# Patient Record
Sex: MALE | Race: WHITE | NOT HISPANIC OR LATINO | Employment: UNEMPLOYED | URBAN - METROPOLITAN AREA
[De-identification: names, ages, dates, MRNs, and addresses within clinical notes are randomized per-mention and may not be internally consistent; named-entity substitution may affect disease eponyms.]

---

## 2018-02-03 ENCOUNTER — HOSPITAL ENCOUNTER (EMERGENCY)
Facility: CLINIC | Age: 38
Discharge: HOME | End: 2018-02-03
Attending: FAMILY MEDICINE | Admitting: FAMILY MEDICINE
Payer: COMMERCIAL

## 2018-02-03 VITALS
OXYGEN SATURATION: 97 % | BODY MASS INDEX: 23.8 KG/M2 | DIASTOLIC BLOOD PRESSURE: 93 MMHG | HEIGHT: 71 IN | RESPIRATION RATE: 20 BRPM | TEMPERATURE: 97 F | WEIGHT: 170 LBS | SYSTOLIC BLOOD PRESSURE: 129 MMHG | HEART RATE: 139 BPM

## 2018-02-03 DIAGNOSIS — R11.2 NAUSEA AND VOMITING, INTRACTABILITY OF VOMITING NOT SPECIFIED, UNSPECIFIED VOMITING TYPE: ICD-10-CM

## 2018-02-03 LAB
ALBUMIN SERPL-MCNC: 4.4 G/DL (ref 3.4–5)
ALP SERPL-CCNC: 64 U/L (ref 40–150)
ALT SERPL W P-5'-P-CCNC: 111 U/L (ref 0–70)
ANION GAP SERPL CALCULATED.3IONS-SCNC: 14 MMOL/L (ref 3–14)
AST SERPL W P-5'-P-CCNC: 63 U/L (ref 0–45)
BASOPHILS # BLD AUTO: 0 10E9/L (ref 0–0.2)
BASOPHILS NFR BLD AUTO: 0.2 %
BILIRUB SERPL-MCNC: 1.5 MG/DL (ref 0.2–1.3)
BUN SERPL-MCNC: 11 MG/DL (ref 7–30)
CALCIUM SERPL-MCNC: 9.6 MG/DL (ref 8.5–10.1)
CHLORIDE SERPL-SCNC: 105 MMOL/L (ref 94–109)
CO2 SERPL-SCNC: 22 MMOL/L (ref 20–32)
CREAT SERPL-MCNC: 0.69 MG/DL (ref 0.66–1.25)
DIFFERENTIAL METHOD BLD: ABNORMAL
EOSINOPHIL # BLD AUTO: 0 10E9/L (ref 0–0.7)
EOSINOPHIL NFR BLD AUTO: 0.2 %
ERYTHROCYTE [DISTWIDTH] IN BLOOD BY AUTOMATED COUNT: 12.7 % (ref 10–15)
ETHANOL SERPL-MCNC: <0.01 G/DL
GFR SERPL CREATININE-BSD FRML MDRD: >90 ML/MIN/1.7M2
GLUCOSE SERPL-MCNC: 105 MG/DL (ref 70–99)
HCT VFR BLD AUTO: 49.9 % (ref 40–53)
HGB BLD-MCNC: 17.3 G/DL (ref 13.3–17.7)
IMM GRANULOCYTES # BLD: 0 10E9/L (ref 0–0.4)
IMM GRANULOCYTES NFR BLD: 0.2 %
LIPASE SERPL-CCNC: 78 U/L (ref 73–393)
LYMPHOCYTES # BLD AUTO: 0.5 10E9/L (ref 0.8–5.3)
LYMPHOCYTES NFR BLD AUTO: 6.4 %
MCH RBC QN AUTO: 32.2 PG (ref 26.5–33)
MCHC RBC AUTO-ENTMCNC: 34.7 G/DL (ref 31.5–36.5)
MCV RBC AUTO: 93 FL (ref 78–100)
MONOCYTES # BLD AUTO: 0.9 10E9/L (ref 0–1.3)
MONOCYTES NFR BLD AUTO: 11.2 %
NEUTROPHILS # BLD AUTO: 6.6 10E9/L (ref 1.6–8.3)
NEUTROPHILS NFR BLD AUTO: 81.8 %
PLATELET # BLD AUTO: 203 10E9/L (ref 150–450)
POTASSIUM SERPL-SCNC: 4.3 MMOL/L (ref 3.4–5.3)
PROT SERPL-MCNC: 8.2 G/DL (ref 6.8–8.8)
RBC # BLD AUTO: 5.38 10E12/L (ref 4.4–5.9)
SODIUM SERPL-SCNC: 141 MMOL/L (ref 133–144)
WBC # BLD AUTO: 8 10E9/L (ref 4–11)

## 2018-02-03 PROCEDURE — 99284 EMERGENCY DEPT VISIT MOD MDM: CPT | Mod: 25 | Performed by: FAMILY MEDICINE

## 2018-02-03 PROCEDURE — 83690 ASSAY OF LIPASE: CPT | Performed by: FAMILY MEDICINE

## 2018-02-03 PROCEDURE — 25000128 H RX IP 250 OP 636: Performed by: FAMILY MEDICINE

## 2018-02-03 PROCEDURE — 99284 EMERGENCY DEPT VISIT MOD MDM: CPT | Mod: Z6 | Performed by: FAMILY MEDICINE

## 2018-02-03 PROCEDURE — 80053 COMPREHEN METABOLIC PANEL: CPT | Performed by: FAMILY MEDICINE

## 2018-02-03 PROCEDURE — 96374 THER/PROPH/DIAG INJ IV PUSH: CPT | Performed by: FAMILY MEDICINE

## 2018-02-03 PROCEDURE — 96375 TX/PRO/DX INJ NEW DRUG ADDON: CPT | Performed by: FAMILY MEDICINE

## 2018-02-03 PROCEDURE — 96361 HYDRATE IV INFUSION ADD-ON: CPT | Performed by: FAMILY MEDICINE

## 2018-02-03 PROCEDURE — 80320 DRUG SCREEN QUANTALCOHOLS: CPT | Performed by: FAMILY MEDICINE

## 2018-02-03 PROCEDURE — 85025 COMPLETE CBC W/AUTO DIFF WBC: CPT | Performed by: FAMILY MEDICINE

## 2018-02-03 PROCEDURE — 25000125 ZZHC RX 250: Performed by: FAMILY MEDICINE

## 2018-02-03 RX ORDER — MONTELUKAST SODIUM 10 MG/1
10 TABLET ORAL DAILY
COMMUNITY
Start: 2017-08-24 | End: 2022-01-11

## 2018-02-03 RX ORDER — SODIUM CHLORIDE 9 MG/ML
1000 INJECTION, SOLUTION INTRAVENOUS CONTINUOUS
Status: DISCONTINUED | OUTPATIENT
Start: 2018-02-03 | End: 2018-02-03 | Stop reason: HOSPADM

## 2018-02-03 RX ORDER — ONDANSETRON 2 MG/ML
4 INJECTION INTRAMUSCULAR; INTRAVENOUS
Status: COMPLETED | OUTPATIENT
Start: 2018-02-03 | End: 2018-02-03

## 2018-02-03 RX ORDER — ONDANSETRON 4 MG/1
4 TABLET, ORALLY DISINTEGRATING ORAL EVERY 8 HOURS PRN
Qty: 20 TABLET | Refills: 0 | Status: SHIPPED | OUTPATIENT
Start: 2018-02-03 | End: 2018-02-06

## 2018-02-03 RX ORDER — KETOROLAC TROMETHAMINE 30 MG/ML
30 INJECTION, SOLUTION INTRAMUSCULAR; INTRAVENOUS ONCE
Status: COMPLETED | OUTPATIENT
Start: 2018-02-03 | End: 2018-02-03

## 2018-02-03 RX ORDER — BUSPIRONE HYDROCHLORIDE 15 MG/1
15 TABLET ORAL 3 TIMES DAILY
COMMUNITY
Start: 2017-08-24

## 2018-02-03 RX ORDER — ONDANSETRON 4 MG/1
4 TABLET, ORALLY DISINTEGRATING ORAL ONCE
Status: COMPLETED | OUTPATIENT
Start: 2018-02-03 | End: 2018-02-03

## 2018-02-03 RX ADMIN — ONDANSETRON 4 MG: 4 TABLET, ORALLY DISINTEGRATING ORAL at 16:38

## 2018-02-03 RX ADMIN — SODIUM CHLORIDE 1000 ML: 9 INJECTION, SOLUTION INTRAVENOUS at 18:45

## 2018-02-03 RX ADMIN — SODIUM CHLORIDE 1000 ML: 9 INJECTION, SOLUTION INTRAVENOUS at 17:46

## 2018-02-03 RX ADMIN — ONDANSETRON 4 MG: 2 INJECTION INTRAMUSCULAR; INTRAVENOUS at 17:49

## 2018-02-03 RX ADMIN — KETOROLAC TROMETHAMINE 30 MG: 30 INJECTION, SOLUTION INTRAMUSCULAR at 17:51

## 2018-02-03 NOTE — ED NOTES
"Pt started vomiting this AM, dry heaves, emesis, diarrhea.  Has tried sips of water \"but it comes back up\"  "

## 2018-02-03 NOTE — ED AVS SNAPSHOT
[facility] Emergency Department    [address] [address]    Phone:  [phone]    Fax:  [phone]                                       Antonio Prieto   MRN: [id number]    Department:  [facility] Emergency Department   Date of Visit:  2/3/2018           Patient Information     Date Of Birth          1980        Your diagnoses for this visit were:     Nausea and vomiting, intractability of vomiting not specified, unspecified vomiting type        You were seen by Brennan Hurt MD.      Follow-up Information     Schedule an appointment as soon as possible for a visit with [facility].    Why:  As needed, If symptoms worsen    Contact information:    [address] [facility] [facility] [address]  [phone]          Discharge Instructions       Clear fluids and bland diet until symptoms are clearly improving.  Zofran as needed for nausea/vomiting.  Return to the emergency department if worse or changes.      24 Hour Appointment Hotline       To make an appointment at any [facility], call [phone] ([phone]). If you don't have a family doctor or clinic, we will help you find one. [facility] are conveniently located to serve the needs of you and your family.             Review of your medicines      START taking        Dose / Directions Last dose taken    * ondansetron 4 MG ODT tab   Commonly known as:  ZOFRAN ODT   Dose:  4 mg   Quantity:  20 tablet        Take 1 tablet (4 mg) by mouth every 8 hours as needed for nausea   Refills:  0        * ondansetron 4 MG ODT tab   Commonly known as:  ZOFRAN ODT   Dose:  4 mg   Quantity:  20 tablet        Take 1 tablet (4 mg) by mouth every 8 hours as needed for nausea   Refills:  0        * Notice:  This list has 2 medication(s) that are the same as other medications prescribed for you. Read the directions carefully, and ask your doctor or other care provider to review them with you.      Our records  show that you are taking the medicines listed below. If these are incorrect, please call your family doctor or clinic.        Dose / Directions Last dose taken    albuterol 108 (90 BASE) MCG/ACT Inhaler   Commonly known as:  PROAIR HFA/PROVENTIL HFA/VENTOLIN HFA   Dose:  2 puff   Indication:  Asthma        Inhale 2 puffs into the lungs every 6 hours as needed   Refills:  0        BENADRYL PO   Dose:  25-50 mg        Take 25-50 mg by mouth 2 times daily as needed   Refills:  0        busPIRone 15 MG tablet   Commonly known as:  BUSPAR   Dose:  15 mg        Take 15 mg by mouth 3 times daily   Refills:  0        FLEXERIL PO   Dose:  10 mg   Indication:  Muscle Spasm        Take 10 mg by mouth 3 times daily as needed   Refills:  0        montelukast 10 MG tablet   Commonly known as:  SINGULAIR   Dose:  10 mg        Take 10 mg by mouth daily In summer only   Refills:  0        PIROXICAM PO   Dose:  20 mg   Indication:  Mild to Moderate Pain        Take 20 mg by mouth daily as needed   Refills:  0                Prescriptions were sent or printed at these locations (2 Prescriptions)                   [facility] [address] [id number]    Telephone:  [phone]   Fax:  [phone]   Hours:                  Printed at Department/Unit printer (2 of 2)         ondansetron (ZOFRAN ODT) 4 MG ODT tab               ondansetron (ZOFRAN ODT) 4 MG ODT tab                Procedures and tests performed during your visit     Alcohol ethyl    CBC with platelets differential    Comprehensive metabolic panel    Lipase      Orders Needing Specimen Collection     None      Pending Results     No orders found from 2/1/2018 to 2/4/2018.            Pending Culture Results     No orders found from 2/1/2018 to 2/4/2018.            Pending Results Instructions     If you had any lab results that were not finalized at the time of your Discharge, you can call  the ED Lab Result RN at [phone]. You will be contacted by this team for any positive Lab results or changes in treatment. The nurses are available 7 days a week from 10A to 6:30P.  You can leave a message 24 hours per day and they will return your call.        Test Results From Your Hospital Stay        2/3/2018  6:12 PM      Component Results     Component Value Ref Range & Units Status    WBC 8.0 4.0 - 11.0 10e9/L Final    RBC Count 5.38 4.4 - 5.9 10e12/L Final    Hemoglobin 17.3 13.3 - 17.7 g/dL Final    Hematocrit 49.9 40.0 - 53.0 % Final    MCV 93 78 - 100 fl Final    MCH 32.2 26.5 - 33.0 pg Final    MCHC 34.7 31.5 - 36.5 g/dL Final    RDW 12.7 10.0 - 15.0 % Final    Platelet Count 203 150 - 450 10e9/L Final    Diff Method Automated Method  Final    % Neutrophils 81.8 % Final    % Lymphocytes 6.4 % Final    % Monocytes 11.2 % Final    % Eosinophils 0.2 % Final    % Basophils 0.2 % Final    % Immature Granulocytes 0.2 % Final    Absolute Neutrophil 6.6 1.6 - 8.3 10e9/L Final    Absolute Lymphocytes 0.5 (L) 0.8 - 5.3 10e9/L Final    Absolute Monocytes 0.9 0.0 - 1.3 10e9/L Final    Absolute Eosinophils 0.0 0.0 - 0.7 10e9/L Final    Absolute Basophils 0.0 0.0 - 0.2 10e9/L Final    Abs Immature Granulocytes 0.0 0 - 0.4 10e9/L Final         2/3/2018  6:34 PM      Component Results     Component Value Ref Range & Units Status    Sodium 141 133 - 144 mmol/L Final    Potassium 4.3 3.4 - 5.3 mmol/L Final    Chloride 105 94 - 109 mmol/L Final    Carbon Dioxide 22 20 - 32 mmol/L Final    Anion Gap 14 3 - 14 mmol/L Final    Glucose 105 (H) 70 - 99 mg/dL Final    Urea Nitrogen 11 7 - 30 mg/dL Final    Creatinine 0.69 0.66 - 1.25 mg/dL Final    GFR Estimate >90 >60 mL/min/1.7m2 Final    Non  GFR Calc    GFR Estimate If Black >90 >60 mL/min/1.7m2 Final    African American GFR Calc    Calcium 9.6 8.5 - 10.1 mg/dL Final    Bilirubin Total 1.5 (H) 0.2 - 1.3 mg/dL Final    Albumin 4.4 3.4 - 5.0 g/dL Final     "Protein Total 8.2 6.8 - 8.8 g/dL Final    Alkaline Phosphatase 64 40 - 150 U/L Final     (H) 0 - 70 U/L Final    AST 63 (H) 0 - 45 U/L Final         2/3/2018  6:32 PM      Component Results     Component Value Ref Range & Units Status    Lipase 78 73 - 393 U/L Final         2/3/2018  6:32 PM      Component Results     Component Value Ref Range & Units Status    Ethanol g/dL <0.01 <0.01 g/dL Final                Thank you for choosing [facility]       Thank you for choosing [facility] for your care. Our goal is always to provide you with excellent care. Hearing back from our patients is one way we can continue to improve our services. Please take a few minutes to complete the written survey that you may receive in the mail after you visit with us. Thank you!        [employer] Information     [employer] lets you send messages to your doctor, view your test results, renew your prescriptions, schedule appointments and more. To sign up, go to [email or web address] . Click on \"Log in\" on the left side of the screen, which will take you to the Welcome page. Then click on \"Sign up Now\" on the right side of the page.     You will be asked to enter the access code listed below, as well as some personal information. Please follow the directions to create your username and password.     Your access code is: [email or web address]  Expires: 2018  8:01 PM     Your access code will  in 90 days. If you need help or a new code, please call your [facility] clinic or [phone].        Care EveryWhere ID     This is your Care EveryWhere ID. This could be used by other organizations to access your [facility] medical records  [id number]        Equal Access to Services     DENZEL GUTIERREZ : Hadii ge Miranda, wanjda lurajivadaha, qaybta kaalmada mehreen, min miner. So [facility] [phone].    ATENCIÓN: Si habla español, tiene a amaya disposición servicios gratuitos de asistencia lingüística. Llame al " [phone].    We comply with applicable federal civil rights laws and [place] laws. We do not discriminate on the basis of race, color, national origin, age, disability, sex, sexual orientation, or gender identity.            After Visit Summary       This is your record. Keep this with you and show to your community pharmacist(s) and doctor(s) at your next visit.

## 2018-02-03 NOTE — ED PROVIDER NOTES
"  History     Chief Complaint   Patient presents with     Vomiting     started today at 0900     HPI  Antonio Prieot is a 37 year old male, past medical history significant for drug abuse, dehydration, abdominal pain, presents to the emergency department with concerns of nausea vomiting and diarrhea beginning at approximately 9:00 this morning.  History is obtained from the patient who identifies sudden onset of nausea and vomiting too many episodes to count at around 9:00 this morning.  He felt fine yesterday evening going to bed.  No suspicion of bad food or water, no recent antibiotics, no exotic travel, no contact with sick persons that he is aware of with intestinal illness.  He notes no fever does complain of chills and sweats over.  He has had approximately 3 episodes of frankly watery diarrhea type stools since symptom onset.  Cannot recall the last time he urinated.  He has been completely unable to keep fluids or solids down since onset of symptoms.    Problem List:    Patient Active Problem List    Diagnosis Date Noted     Abdominal pain, unspecified abdominal location 11/02/2013     Priority: Medium     Problem list name updated by automated process. Provider to review       Nonspecific abnormal results of liver function study 11/02/2013     Priority: Medium     Nausea 11/02/2013     Priority: Medium     Drug abuse 11/02/2013     Priority: Medium     Dehydration 11/02/2013     Priority: Medium        Past Medical History:    Past Medical History:   Diagnosis Date     Asthma      Back pain        Past Surgical History:    No past surgical history on file.    Family History:    No family history on file.    Social History:  Marital Status:  Single [1]  Social History   Substance Use Topics     Smoking status: Light Tobacco Smoker     Packs/day: 0.25     Smokeless tobacco: Never Used      Comment: Pt states \"cocaine is a once a year thing\", marijuana occassionally     Alcohol use 0.0 oz/week     2 - 7 Cans " "of beer per week      Comment: \"2-3 beers per day, it varies.  Some nights it is 2, some it is 7\"        Medications:      busPIRone (BUSPAR) 15 MG tablet   ondansetron (ZOFRAN ODT) 4 MG ODT tab   ondansetron (ZOFRAN ODT) 4 MG ODT tab   DiphenhydrAMINE HCl (BENADRYL PO)   Cyclobenzaprine HCl (FLEXERIL PO)   albuterol (PROAIR HFA, PROVENTIL HFA, VENTOLIN HFA) 108 (90 BASE) MCG/ACT inhaler   PIROXICAM PO   montelukast (SINGULAIR) 10 MG tablet         Review of Systems   All other systems reviewed and are negative.      Physical Exam   BP: (!) 129/93  Pulse: 139  Heart Rate: 118  Temp: 97  F (36.1  C)  Resp: 16  Height: 180.3 cm (5' 11\")  Weight: 77.1 kg (170 lb)  SpO2: 98 %      Physical Exam   Constitutional: He appears well-developed and well-nourished.   Appears uncomfortable, ill but nontoxic   HENT:   Head: Normocephalic and atraumatic.   Right Ear: External ear normal.   Left Ear: External ear normal.   Nose: Nose normal.   Mouth/Throat: Oropharynx is clear and moist.   Eyes: Conjunctivae and EOM are normal. Pupils are equal, round, and reactive to light.   Neck: Normal range of motion. Neck supple.   Cardiovascular: Normal rate, regular rhythm, normal heart sounds and intact distal pulses.    Pulmonary/Chest: Effort normal and breath sounds normal.   Abdominal: Soft. He exhibits no distension and no mass. There is tenderness. There is no rebound and no guarding.       Nursing note and vitals reviewed.      ED Course     ED Course     Procedures               Critical Care time:  none               Labs Ordered and Resulted from Time of ED Arrival Up to the Time of Departure from the ED   CBC WITH PLATELETS DIFFERENTIAL - Abnormal; Notable for the following:        Result Value    Absolute Lymphocytes 0.5 (*)     All other components within normal limits   COMPREHENSIVE METABOLIC PANEL - Abnormal; Notable for the following:     Glucose 105 (*)     Bilirubin Total 1.5 (*)      (*)     AST 63 (*)     All " other components within normal limits   LIPASE   ALCOHOL ETHYL       Assessments & Plan (with Medical Decision Making)   37-year-old male past medical history reviewed as above who presents with concerns of nausea and vomiting as discussed in the HPI.  Physical exam finds him alert uncomfortable with a diffusely tender although nonsurgical abdomen no rebound or guarding and normal bowel sounds.  Lab diagnostics revealed mildly elevated ALT and AST negative lipase and negative alcohol.  Normal CBC.  The patient's symptoms resolved completely with use of IV fluids and Zofran.  Plan to disposition him to home with instructions for clear fluids, bland diet until symptom resolution and the use of Zofran for nausea or vomiting.  All questions were answered the patient's disposition home to the care of his significant other in improved condition.      Disclaimer: This note consists of symbols derived from keyboarding, dictation and/or voice recognition software. As a result, there may be errors in the script that have gone undetected. Please consider this when interpreting information found in this chart.      I have reviewed the nursing notes.    I have reviewed the findings, diagnosis, plan and need for follow up with the patient.          New Prescriptions    ONDANSETRON (ZOFRAN ODT) 4 MG ODT TAB    Take 1 tablet (4 mg) by mouth every 8 hours as needed for nausea    ONDANSETRON (ZOFRAN ODT) 4 MG ODT TAB    Take 1 tablet (4 mg) by mouth every 8 hours as needed for nausea       Final diagnoses:   Nausea and vomiting, intractability of vomiting not specified, unspecified vomiting type       2/3/2018   [facility] EMERGENCY DEPARTMENT     Brennan Hurt MD  02/03/18 2002

## 2018-02-03 NOTE — ED AVS SNAPSHOT
[facility] Emergency Department    [address] [facility] [address]    Phone:  [phone]    Fax:  [phone]                                       Antonio Prieto   MRN: [id number]    Department:  [facility] Emergency Department   Date of Visit:  2/3/2018           After Visit Summary Signature Page     I have received my discharge instructions, and my questions have been answered. I have discussed any challenges I see with this plan with the nurse or doctor.    ..........................................................................................................................................  Patient/Patient Representative Signature      ..........................................................................................................................................  Patient Representative Print Name and Relationship to Patient    ..................................................               ................................................  Date                                            Time    ..........................................................................................................................................  Reviewed by Signature/Title    ...................................................              ..............................................  Date                                                            Time

## 2018-02-04 NOTE — DISCHARGE INSTRUCTIONS
Clear fluids and bland diet until symptoms are clearly improving.  Zofran as needed for nausea/vomiting.  Return to the emergency department if worse or changes.

## 2020-12-18 ENCOUNTER — HOSPITAL ENCOUNTER (EMERGENCY)
Facility: CLINIC | Age: 40
Discharge: HOME | End: 2020-12-18
Attending: NURSE PRACTITIONER | Admitting: NURSE PRACTITIONER
Payer: COMMERCIAL

## 2020-12-18 VITALS
WEIGHT: 180 LBS | TEMPERATURE: 97.3 F | HEIGHT: 71 IN | RESPIRATION RATE: 16 BRPM | SYSTOLIC BLOOD PRESSURE: 151 MMHG | BODY MASS INDEX: 25.2 KG/M2 | DIASTOLIC BLOOD PRESSURE: 94 MMHG | OXYGEN SATURATION: 99 % | HEART RATE: 80 BPM

## 2020-12-18 DIAGNOSIS — L03.90 CELLULITIS: ICD-10-CM

## 2020-12-18 PROCEDURE — G0463 HOSPITAL OUTPT CLINIC VISIT: HCPCS | Performed by: NURSE PRACTITIONER

## 2020-12-18 PROCEDURE — 99213 OFFICE O/P EST LOW 20 MIN: CPT | Performed by: NURSE PRACTITIONER

## 2020-12-18 RX ORDER — CEPHALEXIN 500 MG/1
500 CAPSULE ORAL 4 TIMES DAILY
Qty: 40 CAPSULE | Refills: 0 | Status: SHIPPED | OUTPATIENT
Start: 2020-12-18 | End: 2020-12-28

## 2020-12-18 ASSESSMENT — ENCOUNTER SYMPTOMS
CARDIOVASCULAR NEGATIVE: 1
COLOR CHANGE: 1
CONSTITUTIONAL NEGATIVE: 1
RESPIRATORY NEGATIVE: 1
WOUND: 1
NEUROLOGICAL NEGATIVE: 1

## 2020-12-18 ASSESSMENT — MIFFLIN-ST. JEOR: SCORE: 1753.6

## 2020-12-18 NOTE — ED PROVIDER NOTES
"  History     Chief Complaint   Patient presents with     Wound Infection     dog bite to left third finger 1 month ago, now swollen     HPI  Antonio Prieto is a 40 year old male who presents to the urgent care for evaluation of finger pain. Patient suffered a dog bite to his left 3rd finger about 1 month ago. It is his dog and is UTD on all immunizations. Patient reports he felt it needed stitches at the time but didn't end up coming in. It seemed to be healing well but 3 days ago noticed increasing erythema and surrounding pain. No fevers, numbness, tingling, or decreased mobility of the fingers/hand.     Allergies:  Allergies   Allergen Reactions     Compazine Anaphylaxis     Penicillins Other (See Comments)     possible allergy, has family hx     Zyprexa Swelling     toungue swelling       Sulfa Drugs Rash     Problem List:    Patient Active Problem List    Diagnosis Date Noted     Abdominal pain, unspecified abdominal location 11/02/2013     Priority: Medium     Problem list name updated by automated process. Provider to review       Nonspecific abnormal results of liver function study 11/02/2013     Priority: Medium     Nausea 11/02/2013     Priority: Medium     Drug abuse (H) 11/02/2013     Priority: Medium     Dehydration 11/02/2013     Priority: Medium      Past Medical History:    Past Medical History:   Diagnosis Date     Asthma      Back pain      Past Surgical History:    History reviewed. No pertinent surgical history.    Family History:    History reviewed. No pertinent family history.    Social History:  Marital Status:   [2]  Social History     Tobacco Use     Smoking status: Light Tobacco Smoker     Packs/day: 0.25     Smokeless tobacco: Never Used     Tobacco comment: Pt states \"cocaine is a once a year thing\", marijuana occassionally   Substance Use Topics     Alcohol use: Yes     Alcohol/week: 0.0 standard drinks     Types: 2 - 7 Cans of beer per week     Comment: \"2-3 beers per day, it " "varies.  Some nights it is 2, some it is 7\"     Drug use: Yes     Types: Marijuana, Cocaine      Medications:         cephALEXin (KEFLEX) 500 MG capsule       albuterol (PROAIR HFA, PROVENTIL HFA, VENTOLIN HFA) 108 (90 BASE) MCG/ACT inhaler       busPIRone (BUSPAR) 15 MG tablet       Cyclobenzaprine HCl (FLEXERIL PO)       DiphenhydrAMINE HCl (BENADRYL PO)       montelukast (SINGULAIR) 10 MG tablet       PIROXICAM PO      Review of Systems   Constitutional: Negative.    Respiratory: Negative.    Cardiovascular: Negative.    Skin: Positive for color change and wound.   Neurological: Negative.    All other systems reviewed and are negative.      Physical Exam   BP: (!) 151/94  Pulse: 80  Temp: 97.3  F (36.3  C)  Resp: 16  Height: 180.3 cm (5' 11\")  Weight: 81.6 kg (180 lb)  SpO2: 99 %    Physical Exam  Constitutional:       General: He is not in acute distress.     Appearance: Normal appearance.   Cardiovascular:      Rate and Rhythm: Normal rate and regular rhythm.   Pulmonary:      Effort: Pulmonary effort is normal.      Breath sounds: Normal breath sounds.   Musculoskeletal: Normal range of motion.   Skin:     General: Skin is warm.      Capillary Refill: Capillary refill takes less than 2 seconds.      Comments: There is slight erythema to the dorsal aspect of the distal left middle finger with reported tenderness. Almost entirely healed puncture wound at the DIP joint. Minimal edema.    Neurological:      General: No focal deficit present.      Mental Status: He is alert.       ED Course        Procedures    No results found for this or any previous visit (from the past 24 hour(s)).    Medications - No data to display    Assessments & Plan (with Medical Decision Making)   Antonio Prieto is a 40 year old male who presents to the urgent care for evaluation of finger pain. Patient suffered a dog bite to his left 3rd finger about 1 month ago. It is his dog and is UTD on all immunizations. Patient reports he felt it " needed stitches at the time but didn't end up coming in. It seemed to be healing well but 3 days ago noticed increasing erythema and surrounding pain. Exam as above. Low suspicion for septic joint or tenosynovitis. Unlikely that this skin infection is just now developing from the bite 1 month ago, maybe secondary infection to the wound. Will cover with Keflex given allergy profile. Educated on home wound care. Return precautions reviewed, all questions answered. Patient is agreeable to plan of care and discharged in no acute distress.     I have reviewed the nursing notes.    I have reviewed the findings, diagnosis, plan and need for follow up with the patient.  Discharge Medication List as of 12/18/2020  1:19 PM      START taking these medications    Details   cephALEXin (KEFLEX) 500 MG capsule Take 1 capsule (500 mg) by mouth 4 times daily for 10 days, Disp-40 capsule, R-0, E-Prescribe           Final diagnoses:   Cellulitis     12/18/2020   [facility] EMERGENCY DEPT     Terra Eaton, APRN CNP  12/18/20 3515

## 2020-12-18 NOTE — ED AVS SNAPSHOT
[facility] Emergency Dept  [address] [address]  Phone: [phone]  Fax: [phone]                                    Antonio Prieto   MRN: [id number]    Department: [facility] Emergency Dept   Date of Visit: 12/18/2020           After Visit Summary Signature Page    I have received my discharge instructions, and my questions have been answered. I have discussed any challenges I see with this plan with the nurse or doctor.    ..........................................................................................................................................  Patient/Patient Representative Signature      ..........................................................................................................................................  Patient Representative Print Name and Relationship to Patient    ..................................................               ................................................  Date                                   Time    ..........................................................................................................................................  Reviewed by Signature/Title    ...................................................              ..............................................  Date                                               Time          22EPIC Rev 08/18

## 2022-01-08 ENCOUNTER — HEALTH MAINTENANCE LETTER (OUTPATIENT)
Age: 42
End: 2022-01-08

## 2022-01-11 ENCOUNTER — ANCILLARY PROCEDURE (OUTPATIENT)
Dept: GENERAL RADIOLOGY | Facility: CLINIC | Age: 42
End: 2022-01-11
Attending: FAMILY MEDICINE
Payer: COMMERCIAL

## 2022-01-11 ENCOUNTER — OFFICE VISIT (OUTPATIENT)
Dept: FAMILY MEDICINE | Facility: CLINIC | Age: 42
End: 2022-01-11
Payer: COMMERCIAL

## 2022-01-11 VITALS
DIASTOLIC BLOOD PRESSURE: 84 MMHG | HEART RATE: 86 BPM | BODY MASS INDEX: 26.74 KG/M2 | SYSTOLIC BLOOD PRESSURE: 130 MMHG | TEMPERATURE: 98.3 F | HEIGHT: 71 IN | OXYGEN SATURATION: 98 % | RESPIRATION RATE: 14 BRPM | WEIGHT: 191 LBS

## 2022-01-11 DIAGNOSIS — Z98.890 HX OF CERVICAL SPINE SURGERY: ICD-10-CM

## 2022-01-11 DIAGNOSIS — M54.12 CERVICAL RADICULOPATHY: Primary | ICD-10-CM

## 2022-01-11 PROCEDURE — 99204 OFFICE O/P NEW MOD 45 MIN: CPT | Performed by: FAMILY MEDICINE

## 2022-01-11 PROCEDURE — 72040 X-RAY EXAM NECK SPINE 2-3 VW: CPT | Performed by: RADIOLOGY

## 2022-01-11 RX ORDER — GABAPENTIN 300 MG/1
CAPSULE ORAL
Qty: 90 CAPSULE | Refills: 0 | Status: SHIPPED | OUTPATIENT
Start: 2022-01-11

## 2022-01-11 ASSESSMENT — MIFFLIN-ST. JEOR: SCORE: 1798.5

## 2022-01-11 NOTE — PROGRESS NOTES
Assessment & Plan     Cervical radiculopathy  - XR Cervical Spine 2/3 Views  - gabapentin (NEURONTIN) 300 MG capsule  Dispense: 90 capsule; Refill: 0    Hx of cervical spine surgery  - XR Cervical Spine 2/3 Views    Atraumatic onset of right-sided cervical radiculopathy few weeks ago.  Exam today did not reveal red flags.  Obtain cervical xray to assess bony spine and disc spaces. Consider MRI depending on xray finding.  Consider referral back to cervical spine clinic.  Offered and discussed gabapentin use for radicular pain control. Advised patient of possible ADR to med. He said he has taken it before and has  bottle at home.  Rx sent to pharmacy.  Activity as tolerated.  Return precautions discussed and given to patient.     Patient Instructions     Result of xray to be relayed to you in 24 hours.    Take gabapentin as prescribed to help relieve some nerve pain.    Further recommendations when xray results are available.  Patient Education     Understanding Cervical Radiculopathy    Cervical radiculopathy is irritation or inflammation of a nerve root in the neck. It causes neck pain and other symptoms that may spread into the chest or down the arm. To understand this condition, it helps to understand the parts of the spine:    Vertebrae. These are bones that stack to form the spine. The cervical spine contains the 7 vertebrae in the neck.    Disks. These are soft pads of tissue between the vertebrae. They act as shock absorbers for the spine.    The spinal canal. This is a tunnel formed within the stacked vertebrae. The spinal cord runs through this canal.    Nerves. These branch off the spinal cord. As they leave the spinal canal, nerves pass through openings between the vertebrae. The nerve root is the part of the nerve that is closest to the spinal cord.   With cervical radiculopathy, nerve roots in the neck become irritated. This leads to pain and symptoms that can travel to the nerves that go from  the spinal cord down the arms and into the torso.  What causes cervical radiculopathy?  Aging, injury, poor posture, and other issues can lead to problems in the neck. These problems may then irritate nerve roots. These include:    Damage to a disk in the cervical spine. The damaged disk may then press on nearby nerve roots.    Degeneration from wear and tear, and aging. This can lead to narrowing (stenosis) of the openings between the vertebrae. The narrowed openings press on nerve roots as they leave the spinal canal.    An unstable spine. This is when a vertebra slips forward. It can then press on a nerve root.  There are other, less common causes of pressure on nerves in the neck. These include infection, cysts, and tumors.  Symptoms of cervical radiculopathy  These include:    Neck pain    Pain, numbness, tingling, or weakness that travels down the arm    Loss of neck movement    Muscle spasms  Treatment for cervical radiculopathy  In most cases, your healthcare provider will first try treatments that help relieve symptoms. These may include:    Prescription or over-the-counter pain medicines. These help relieve pain and swelling.    Cold packs. These help reduce pain.    Resting. This involves avoiding positions and activities that increase pain.    Neck brace (cervical collar). This can help relieve inflammation and pain.    Physical therapy, including exercises and stretches. This can help decrease pain and increase movement and function.    Shots of medicinesaround the nerve roots. This is done to help relieve symptoms for a time.  In some cases, your healthcare provider may advise surgery to fix the underlying problem. This depends on the cause, the symptoms, and how long the pain has lasted.  Possible complications  Over time, an irritated and inflamed nerve may become damaged. This may lead to long-lasting (permanent) numbness or weakness. If symptoms change suddenly or get worse, be sure to let your  healthcare provider know.     When to call your healthcare provider  Call your healthcare provider right away if you have any of these:    New pain or pain that gets worse    New or increasing weakness, numbness, or tingling in your arm or hand    Bowel or bladder changes   Lesli last reviewed this educational content on 3/10/2016    3134-2947 [employer]. All rights reserved. This information is not intended as a substitute for professional medical care. Always follow your healthcare professional's instructions.               Return in about 1 week (around 1/18/2022) for In-clinic visit for if with significant worsening.    Greg Reynoso MD  [facility]    James Alvarez is a 41 year old who presents for the following health issues   Chief Complaint   Patient presents with     Neck Pain     Pt here for neck pain where he had disc replacement in the past.  Also having right arm go numb at times.       HPI     Pain History:  When did you first notice your pain? - 3 wks  Have you seen anyone else for your pain? No  Where in your body do you have pain? Neck Pain  Onset/Duration: 3 wks ago   Description:   Location: neck  Radiation: into the right shoulder, into the right forearm and into the right hand  Intensity: 5-10/10  Progression of Symptoms:  worsening and constant  Accompanying Signs & Symptoms:  Burning, tingling, prickly sensation in arm(s): YES- tingling down arm  Numbness in arm(s): YES  Weakness in arm(s):  YES- favors it, has been using a sling  Fever: no  Headache: no  Nausea and/or vomiting: no  History:   Trauma: no  Previous neck pain: YES  Previous surgery or injections: YES- c spine disc replacement 5-6 years ago   Previous Imaging (MRI,X ray): no  Precipitating or alleviating factors: ibuprofen helps a little with neck  Does movement impact the pain:  YES  Therapies tried and outcome: heat, support wrap and Ibuprofen      Review of Systems   C:  "NEGATIVE for fever, chills, change in weight  I: NEGATIVE for worrisome rashes, moles or lesions  GI: NEGATIVE for nausea, abdominal pain, heartburn, or change in bowel habits  : NEGATIVE for frequency, dysuria, or hematuria  MUSCULOSKELETAL: see above  N: NEGATIVE for weakness, dizziness or paresthesias  H: NEGATIVE for bleeding problems      Objective    /84   Pulse 86   Temp 98.3  F (36.8  C) (Tympanic)   Resp 14   Ht 1.803 m (5' 11\")   Wt 86.6 kg (191 lb)   SpO2 98%   BMI 26.64 kg/m    Body mass index is 26.64 kg/m .  Physical Exam   GENERAL:  alert and no distress, ambulatory w/o assist  NECK: no gross deformity; full range of motion all directions, no TTP  MS: extremities- no gross deformities noted, no edema  SKIN: no suspicious lesions, no rashes  Neuro: hypoesthesia along right  posterolateral shoulder, posterolateral upper arm radiating to the 3rd finger and radial aspect of the 4th finger.      Results for orders placed or performed in visit on 01/11/22   XR Cervical Spine 2/3 Views     Status: None    Narrative    CERVICAL SPINE TWO TO THREE VIEWS  1/11/2022 11:08 AM     COMPARISON: None.    HISTORY:  Rule out disc protrusion; status post disc replacement  several years ago (C5-C6?). Cervical radiculopathy. History of  cervical spine surgery.      Impression    IMPRESSION: There is a prosthetic intervertebral disc in the C6-C7  disc space. There is no evidence for malposition or malalignment of  the prosthetic disc components. There is normal alignment of the  cervical vertebrae; however, there is straightening of normal cervical  lordosis. Vertebral body heights of the cervical spine are normal.  Craniocervical alignment is normal. There is no evidence for fracture  of the cervical spine.     JOHN WELCH MD         SYSTEM ID:  [id number]           "

## 2022-01-11 NOTE — PATIENT INSTRUCTIONS
Result of xray to be relayed to you in 24 hours.    Take gabapentin as prescribed to help relieve some nerve pain.    Further recommendations when xray results are available.  Patient Education     Understanding Cervical Radiculopathy    Cervical radiculopathy is irritation or inflammation of a nerve root in the neck. It causes neck pain and other symptoms that may spread into the chest or down the arm. To understand this condition, it helps to understand the parts of the spine:    Vertebrae. These are bones that stack to form the spine. The cervical spine contains the 7 vertebrae in the neck.    Disks. These are soft pads of tissue between the vertebrae. They act as shock absorbers for the spine.    The spinal canal. This is a tunnel formed within the stacked vertebrae. The spinal cord runs through this canal.    Nerves. These branch off the spinal cord. As they leave the spinal canal, nerves pass through openings between the vertebrae. The nerve root is the part of the nerve that is closest to the spinal cord.   With cervical radiculopathy, nerve roots in the neck become irritated. This leads to pain and symptoms that can travel to the nerves that go from the spinal cord down the arms and into the torso.  What causes cervical radiculopathy?  Aging, injury, poor posture, and other issues can lead to problems in the neck. These problems may then irritate nerve roots. These include:    Damage to a disk in the cervical spine. The damaged disk may then press on nearby nerve roots.    Degeneration from wear and tear, and aging. This can lead to narrowing (stenosis) of the openings between the vertebrae. The narrowed openings press on nerve roots as they leave the spinal canal.    An unstable spine. This is when a vertebra slips forward. It can then press on a nerve root.  There are other, less common causes of pressure on nerves in the neck. These include infection, cysts, and tumors.  Symptoms of cervical  radiculopathy  These include:    Neck pain    Pain, numbness, tingling, or weakness that travels down the arm    Loss of neck movement    Muscle spasms  Treatment for cervical radiculopathy  In most cases, your healthcare provider will first try treatments that help relieve symptoms. These may include:    Prescription or over-the-counter pain medicines. These help relieve pain and swelling.    Cold packs. These help reduce pain.    Resting. This involves avoiding positions and activities that increase pain.    Neck brace (cervical collar). This can help relieve inflammation and pain.    Physical therapy, including exercises and stretches. This can help decrease pain and increase movement and function.    Shots of medicinesaround the nerve roots. This is done to help relieve symptoms for a time.  In some cases, your healthcare provider may advise surgery to fix the underlying problem. This depends on the cause, the symptoms, and how long the pain has lasted.  Possible complications  Over time, an irritated and inflamed nerve may become damaged. This may lead to long-lasting (permanent) numbness or weakness. If symptoms change suddenly or get worse, be sure to let your healthcare provider know.     When to call your healthcare provider  Call your healthcare provider right away if you have any of these:    New pain or pain that gets worse    New or increasing weakness, numbness, or tingling in your arm or hand    Bowel or bladder changes   Lesli last reviewed this educational content on 3/10/2016    5361-4492 [employer]. All rights reserved. This information is not intended as a substitute for professional medical care. Always follow your healthcare professional's instructions.

## 2022-01-13 ENCOUNTER — TELEPHONE (OUTPATIENT)
Dept: FAMILY MEDICINE | Facility: CLINIC | Age: 42
End: 2022-01-13
Payer: COMMERCIAL

## 2022-01-13 DIAGNOSIS — F41.8 SITUATIONAL ANXIETY: Primary | ICD-10-CM

## 2022-01-13 DIAGNOSIS — M54.12 CERVICAL RADICULOPATHY: ICD-10-CM

## 2022-01-13 NOTE — TELEPHONE ENCOUNTER
Reason for Call:  Other prescription    Detailed comments: Patient called and has a MRI scheduled for 1/19/2022 and would like a medication to help sedate him please.  Patient asked for it to be sent to [facility] in [place].    Phone Number Patient can be reached at: Cell number on file:    Telephone Information:   Mobile [phone]       Best Time:     Can we leave a detailed message on this number? YES    Call taken on 1/13/2022 at 1:09 PM by Qiana Lopez

## 2022-01-17 RX ORDER — LORAZEPAM 1 MG/1
1 TABLET ORAL ONCE
Qty: 1 TABLET | Refills: 0 | Status: SHIPPED | OUTPATIENT
Start: 2022-01-17 | End: 2022-01-17

## 2022-01-17 NOTE — TELEPHONE ENCOUNTER
Dr. Reynoso,    Patient has MRI scheduled for Wednesday.  Last time he did this he had very bad anxiety and panic attack.  He has someone who will drive him to and from test if we can prescribe something for him. Genie ESTRELLA RN

## 2022-01-18 NOTE — TELEPHONE ENCOUNTER
Rx for lorazepam has been sent to the pharmacy.  Patient should have someone to take him to and from the appointment. No driving for several hours once he takes the medication, and until any sedation wears off.

## 2022-01-19 ENCOUNTER — HOSPITAL ENCOUNTER (OUTPATIENT)
Dept: MRI IMAGING | Facility: CLINIC | Age: 42
Discharge: HOME | End: 2022-01-19
Attending: FAMILY MEDICINE | Admitting: FAMILY MEDICINE
Payer: COMMERCIAL

## 2022-01-19 DIAGNOSIS — M54.12 CERVICAL RADICULOPATHY: ICD-10-CM

## 2022-01-19 DIAGNOSIS — Z98.890 HX OF CERVICAL SPINE SURGERY: ICD-10-CM

## 2022-01-19 PROCEDURE — 72141 MRI NECK SPINE W/O DYE: CPT

## 2022-03-16 ENCOUNTER — OFFICE VISIT (OUTPATIENT)
Dept: PALLIATIVE MEDICINE | Facility: CLINIC | Age: 42
End: 2022-03-16
Attending: NURSE PRACTITIONER
Payer: COMMERCIAL

## 2022-03-16 VITALS — DIASTOLIC BLOOD PRESSURE: 74 MMHG | HEART RATE: 68 BPM | SYSTOLIC BLOOD PRESSURE: 109 MMHG

## 2022-03-16 DIAGNOSIS — M54.12 CERVICAL RADICULOPATHY: ICD-10-CM

## 2022-03-16 PROCEDURE — 99203 OFFICE O/P NEW LOW 30 MIN: CPT | Performed by: STUDENT IN AN ORGANIZED HEALTH CARE EDUCATION/TRAINING PROGRAM

## 2022-03-16 ASSESSMENT — PAIN SCALES - GENERAL: PAINLEVEL: MODERATE PAIN (5)

## 2022-03-16 NOTE — PROGRESS NOTES
"Antonio Prieto  :  1980  DOS: 3/16/2022  MRN: [id number]    Medical Spine Medicine Clinic Visit    PCP: Gillian, [facility]    Antonio Prieto is a 41 year old male with a history of drug abuse referred by Terri Irwin for: Cervical radiculopathy    5-6 mo gets numb across the R upper trapezius and extending down the posterior aspect of the upper arm to lateral epicondyle, posterior forearm, 2nd and 4th finger.   The numbness over the upper trap and arm has been constant for past 5 mo.   He doesn't notice any weakness. He notices he has full range of motion of the R arm.   When it started he had substantial pain and now his symptoms are just numbness.     Driving doesn't exacerbate his arm symptoms but be gets swelling and pain over the C7 spinous process. This has been present since his cervical surgery. He called this an inflammed sensation.     Of note Hospital admission note from 2013 noted and mission with BAL of 0.13 after binge drinking the day before.    Current pain medications:   -Cyclobenzaprine 10 mg 3 times daily as needed  -Gabapentin 300 mg 3 times daily  -Piroxicam 20 mg daily    Other pertinent medications:  -BuSpar 15 mg 3 times daily    Anticoagulants:  -No    Injections:   - corticosteroid injection for sensation of \"inflammed\" after surgery. NH    History of Surgery in the painful area:   - C6-C7 disc arthroplasty    Social History:     Past Medical History:   Diagnosis Date     Asthma      Back pain     pts s/o states pt has \"3 bulging discs\"     No past surgical history on file.  Family History   Problem Relation Age of Onset     Hypertension Father        Objective  /74   Pulse 68       General: healthy, alert and in no distress      HEENT: no scleral icterus or conjunctival erythema     Skin: no suspicious lesions or rash. No jaundice.     CV: regular rhythm by palpation, 2+ distal pulses, no pedal edema      Resp: normal respiratory effort without " conversational dyspnea     Psych: normal mood and affect      Gait: nonantalgic, appropriate coordination and balance     Neuro: Motor strength as noted below     Neck exam:    Inspection:       no visible deformity in the low back       normal skin       normal vascular       normal lymphatic    Cervical spine:  Range of motion within normal limits   Myofascial tenderness: bilateral upper trapezius muscles  No focal spinous process tenderness.   Spurling's negative bilaterally.     Shoulder exam:   No shoulder girdle wasting or scapular dyskinesis. No palmar muscle wasting. No tenderness of bicipital groove, AC, GH, or SC joints. Shoulder range of motion within normal limits. Stanley', Neers, and empty-can are negative.     Carpal Tunnel tests:   Tinel's negative    Ulnar neuropathy tests:   Tinel's at the ulnar groove negative    Neurologic exam:  CN:  Cranial nerves 2-12 are grossly intact  Motor Strength:  5/5 symmetric UE and LE strength,    Reflexes:    REFLEXES: Normal and symmetric in bilateral biceps, brachioradialis, triceps    Other reflexes:    Taveras's negative bilaterally    Sensory:  (upper and lower extremities):   Light touch: normal except over the lateral aspect of his right upper arm   Allodynia: absent    Hyperalgesia: absent        Radiology:  MRI cervical spine without contrast 1/19/2022  IMPRESSION:    1. Postoperative change of C6-C7 disc arthroplasty with artifact  surrounding the prosthesis.  2. Mild degenerative change as detailed.  3. No high-grade stenoses.   4. 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     Assessment:  1. Cervical radiculopathy      Pain but not numbness has resolved after onset of symptoms.  Patient would like to continue to monitor.  Recommended restarting physical therapy    Plan:  Discussed the assessment with the patient.  Continue to monitor, restart physical therapy  exercises  Follow up: as needed    BILLING TIME DOCUMENTATION:   The total TIME spent on this patient on the date of the encounter/appointment was 30 minutes.      TOTAL TIME includes:   Time spent preparing to see the patient (reviewing records and tests) - 2 min  Time spent face to face (or over the phone) with the patient - 26 min  Time spent ordering tests, medications, procedures and referrals - 0 min  Time spent Referring and communicating with other healthcare professionals - 0 min  Time spent documenting clinical information in Epic - 2 min     Laura Perez MD  [facility]     Disclaimer: This note consists of symbols derived from keyboarding, dictation and/or voice recognition software. As a result, there may be errors in the script that have gone undetected. Please consider this when interpreting information found in this chart.

## 2022-03-16 NOTE — PATIENT INSTRUCTIONS
I think your right arm numbness is most likely from nerve damage that occurred at the time of your pain, about 5 mo ago. This should slowly resolve on its own.     I think the inflamed sensation you get in your neck is due to muscular strain. That is a very common experience with muscular strain related to head position.     For this we will:     - Continue to monitor  - Re-start your physical therapy exercises    Follow up:   - As needed. Come in if symptoms worsening or not improving     Laura Perez MD  [facility]     ----------------------------------------------------------------  Clinic Number:  [phone]     Call with any questions about your care and for scheduling assistance.     Calls are returned Monday through Friday between 8 AM and 4:30 PM. We usually get back to you within 2 business days depending on the issue/request.    If we are prescribing your medications:    For opioid medication refills, call the clinic or send a IPLocks message 7 days in advance.  Please include:    Name of requested medication    Name of the pharmacy.    For non-opioid medications, call your pharmacy directly to request a refill. Please allow 3-4 days to be processed.     Per [place] State Law:    All controlled substance prescriptions must be filled within 30 days of being written.      For those controlled substances allowing refills, pickup must occur within 30 days of last fill.      We believe regular attendance is key to your success in our program!      Any time you are unable to keep your appointment we ask that you call us at least 24 hours in advance to cancel.This will allow us to offer the appointment time to another patient.     Multiple missed appointments may lead to dismissal from the clinic.

## 2022-11-19 ENCOUNTER — HEALTH MAINTENANCE LETTER (OUTPATIENT)
Age: 42
End: 2022-11-19

## 2023-04-09 ENCOUNTER — HEALTH MAINTENANCE LETTER (OUTPATIENT)
Age: 43
End: 2023-04-09

## 2024-06-16 ENCOUNTER — HEALTH MAINTENANCE LETTER (OUTPATIENT)
Age: 44
End: 2024-06-16